# Patient Record
Sex: FEMALE | Race: WHITE | NOT HISPANIC OR LATINO | Employment: STUDENT | URBAN - METROPOLITAN AREA
[De-identification: names, ages, dates, MRNs, and addresses within clinical notes are randomized per-mention and may not be internally consistent; named-entity substitution may affect disease eponyms.]

---
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
## 2024-02-13 DIAGNOSIS — N92.6 IRREGULAR MENSES: ICD-10-CM

## 2024-02-15 NOTE — TELEPHONE ENCOUNTER
Please call patient to inform her that she needs to follow up with adult provider and gyn.  I can refill for an additional 3 months to get her through but she needs to understand that cannot follow here anymore due to her age.

## 2024-02-20 ENCOUNTER — TELEPHONE (OUTPATIENT)
Dept: PEDIATRICS CLINIC | Facility: CLINIC | Age: 19
End: 2024-02-20

## 2024-02-23 RX ORDER — NORETHINDRONE ACETATE AND ETHINYL ESTRADIOL AND FERROUS FUMARATE 1MG-20(21)
1 KIT ORAL DAILY
Qty: 84 TABLET | Refills: 0 | Status: SHIPPED | OUTPATIENT
Start: 2024-02-23

## 2024-02-28 NOTE — TELEPHONE ENCOUNTER
02/28/24 2:27 PM        The office's request has been received, reviewed, and the patient chart updated. The PCP has successfully been removed with a patient attribution note. This message will now be completed.        Thank you  Jo Go

## 2024-05-02 ENCOUNTER — OFFICE VISIT (OUTPATIENT)
Dept: INTERNAL MEDICINE CLINIC | Facility: CLINIC | Age: 19
End: 2024-05-02
Payer: COMMERCIAL

## 2024-05-02 VITALS
OXYGEN SATURATION: 98 % | WEIGHT: 136.2 LBS | BODY MASS INDEX: 24.13 KG/M2 | DIASTOLIC BLOOD PRESSURE: 70 MMHG | HEIGHT: 63 IN | HEART RATE: 71 BPM | SYSTOLIC BLOOD PRESSURE: 118 MMHG

## 2024-05-02 DIAGNOSIS — Z01.419 ENCOUNTER FOR GYNECOLOGICAL EXAMINATION WITHOUT ABNORMAL FINDING: ICD-10-CM

## 2024-05-02 DIAGNOSIS — Z86.69 HISTORY OF HYDROCEPHALUS AS A CHILD: ICD-10-CM

## 2024-05-02 DIAGNOSIS — Z80.0 FAMILY HISTORY OF COLON CANCER IN FATHER: ICD-10-CM

## 2024-05-02 DIAGNOSIS — N92.6 IRREGULAR MENSES: ICD-10-CM

## 2024-05-02 DIAGNOSIS — F41.9 ANXIETY: Primary | ICD-10-CM

## 2024-05-02 DIAGNOSIS — Z86.19 HISTORY OF CMV: ICD-10-CM

## 2024-05-02 DIAGNOSIS — Q04.9 CONGENITAL BRAIN ANOMALY (HCC): ICD-10-CM

## 2024-05-02 DIAGNOSIS — F41.0 PANIC ATTACKS: ICD-10-CM

## 2024-05-02 PROCEDURE — 99203 OFFICE O/P NEW LOW 30 MIN: CPT | Performed by: INTERNAL MEDICINE

## 2024-05-02 PROCEDURE — 3725F SCREEN DEPRESSION PERFORMED: CPT | Performed by: INTERNAL MEDICINE

## 2024-05-02 RX ORDER — CLOBETASOL PROPIONATE 0.5 MG/G
OINTMENT TOPICAL 2 TIMES DAILY
COMMUNITY
Start: 2024-03-05

## 2024-05-02 RX ORDER — ESCITALOPRAM OXALATE 5 MG/1
5 TABLET ORAL DAILY
Qty: 30 TABLET | Refills: 5 | Status: SHIPPED | OUTPATIENT
Start: 2024-05-02

## 2024-05-02 RX ORDER — NORETHINDRONE ACETATE AND ETHINYL ESTRADIOL 1MG-20(21)
1 KIT ORAL DAILY
Qty: 84 TABLET | Refills: 3 | Status: SHIPPED | OUTPATIENT
Start: 2024-05-02

## 2024-05-02 NOTE — PROGRESS NOTES
Assessment/Plan:     Patient is a very pleasant 18-year-old female here to establish care.  Previously following with pediatrics.  Is in college and goes to the [employer].  Studying  radiology tech.  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  We reviewed her medical problems.  Reviewed her concerns today.  Refilled her birth control.  She has a history of CMV mono.  History of hydrocephalus as a baby.  Has a boyfriend for 3 years.    Reports dad has recently been diagnosed with stage III colon cancer.  Had surgery and is on chemotherapy.  Having some anxiety which is starting to affect her quality of life.  Having panic attacks where she will get chest pain shortness of breath.  Her sister was on Prozac in the past.  Her mom did tell her that she is noticing changes in her related to this.  Discussed using SSRIs.  Will try Lexapro 5 mg.  She will come back in 1 month to check in.  Hold off on Atarax for now.    She will be due for colonoscopy at age 29.  Mammogram at age 39.  Hold off on labs for now.  GYN ordered for pelvic exam.    LMP irregular - on birth control.     Quality Measures:       Depression Screening and Follow-up Plan: Patient was screened for depression during today's encounter. They screened negative with a PHQ-2 score of 0.       Return in about 4 weeks (around 5/30/2024).    No problem-specific Assessment & Plan notes found for this encounter.       Diagnoses and all orders for this visit:    Anxiety  -     escitalopram (LEXAPRO) 5 mg tablet; Take 1 tablet (5 mg total) by mouth daily    Irregular menses  -     norethindrone-ethinyl estradiol (Aurovela FE 1/20) 1-20 MG-MCG per tablet; Take 1 tablet by mouth daily    Congenital brain anomaly (HCC)    History of hydrocephalus as a child    Family history of colon cancer in father    Panic attacks    History of CMV    Encounter for gynecological examination without abnormal finding  -     Ambulatory Referral to Obstetrics / Gynecology;  Future    Other orders  -     clobetasol (TEMOVATE) 0.05 % ointment; Apply topically 2 (two) times a day          Subjective:      Patient ID: Meche Contreras is a 18 y.o. female.    Here to establish care.    Anxiety  Presents for initial visit. Onset was 6 to 12 months ago. The problem has been waxing and waning. Symptoms include chest pain, excessive worry, nervous/anxious behavior, palpitations and panic. Patient reports no shortness of breath.         ALLERGIES:  Allergies   Allergen Reactions   • Lactose - Food Allergy Diarrhea and Abdominal Pain       CURRENT MEDICATIONS:    Current Outpatient Medications:   •  clobetasol (TEMOVATE) 0.05 % ointment, Apply topically 2 (two) times a day, Disp: , Rfl:   •  escitalopram (LEXAPRO) 5 mg tablet, Take 1 tablet (5 mg total) by mouth daily, Disp: 30 tablet, Rfl: 5  •  norethindrone-ethinyl estradiol (Aurovela FE 1/20) 1-20 MG-MCG per tablet, Take 1 tablet by mouth daily, Disp: 84 tablet, Rfl: 3    ACTIVE PROBLEM LIST:  Patient Active Problem List   Diagnosis   • Congenital brain anomaly (HCC)   • Cytomegaloviral mononucleosis   • Lactose intolerance       PAST MEDICAL HISTORY:  Past Medical History:   Diagnosis Date   • Abnormal LFTs 11/30/2021   • Bronchiolitis 01/2006   • EBV hepatitis 08/09/2021   • Eczema    • External hydrocephalus (HCC)    • Mononucleosis    • Wrist fracture     left onset 3/2016       PAST SURGICAL HISTORY:  Past Surgical History:   Procedure Laterality Date   • WISDOM TOOTH EXTRACTION         FAMILY HISTORY:  Family History   Problem Relation Age of Onset   • Asthma Mother    • Hemophilia Mother         A carrier   • Cancer Father    • Hemophilia Brother         Severe Hemophilia A   • Seizures Maternal Grandfather    • Chronic granulomatous disease Maternal Grandfather         [employer]   • Scoliosis Maternal Uncle    • Diabetes type I Maternal Uncle    • Hemophilia Maternal Uncle         severe A   • Hemophilia Cousin         Severe Hemophilia A        SOCIAL HISTORY:  Social History     Socioeconomic History   • Marital status: Single     Spouse name: Not on file   • Number of children: Not on file   • Years of education: Not on file   • Highest education level: Not on file   Occupational History   • Not on file   Tobacco Use   • Smoking status: Never   • Smokeless tobacco: Never   Vaping Use   • Vaping status: Never Used   Substance and Sexual Activity   • Alcohol use: Not Currently   • Drug use: Never   • Sexual activity: Yes     Partners: Male     Birth control/protection: Condom Male   Other Topics Concern   • Not on file   Social History Narrative    Lives with both parents in alternating homes, with older sister, younger sister; younger maternal half brother    Pets: dog, cat at Dad's house.  Cats 2 at mom's    Guns in the home stored in locked cabinet. Dad's house    Has carbon monoxide detectors in home    Has smoke detectors    Wears seat belt.    School: 12th grade, [employer] , fall 2022     Social Determinants of Health     Financial Resource Strain: Not on file   Food Insecurity: Not on file   Transportation Needs: Not on file   Physical Activity: Not on file   Stress: Not on file   Social Connections: Not on file   Intimate Partner Violence: Not on file   Housing Stability: Not on file       Review of Systems   Constitutional:  Negative for chills and fever.   HENT:  Negative for ear pain and sore throat.    Eyes:  Negative for pain and visual disturbance.   Respiratory:  Negative for cough and shortness of breath.    Cardiovascular:  Positive for chest pain and palpitations.   Gastrointestinal:  Negative for abdominal pain and vomiting.   Genitourinary:  Negative for dysuria and hematuria.   Musculoskeletal:  Negative for arthralgias and back pain.   Skin:  Negative for color change and rash.   Neurological:  Negative for seizures and syncope.   Psychiatric/Behavioral:  Positive for dysphoric mood. The patient is nervous/anxious.    All  "other systems reviewed and are negative.        Objective:  Vitals:    05/02/24 1154   BP: 118/70   BP Location: Left arm   Patient Position: Sitting   Cuff Size: Standard   Pulse: 71   SpO2: 98%   Weight: 61.8 kg (136 lb 3.2 oz)   Height: 5' 3\" (1.6 m)     Body mass index is 24.13 kg/m².     Physical Exam  Vitals and nursing note reviewed.   Constitutional:       Appearance: Normal appearance. She is normal weight.   HENT:      Head: Normocephalic and atraumatic.   Cardiovascular:      Rate and Rhythm: Normal rate and regular rhythm.      Heart sounds: Normal heart sounds.   Pulmonary:      Effort: Pulmonary effort is normal.      Breath sounds: Normal breath sounds.   Abdominal:      General: Abdomen is flat. Bowel sounds are normal.      Palpations: Abdomen is soft.   Musculoskeletal:         General: Normal range of motion.      Cervical back: Normal range of motion.      Right lower leg: No edema.      Left lower leg: No edema.   Lymphadenopathy:      Cervical: No cervical adenopathy.   Skin:     General: Skin is warm and dry.   Neurological:      General: No focal deficit present.      Mental Status: She is alert and oriented to person, place, and time. Mental status is at baseline.   Psychiatric:         Mood and Affect: Mood normal.         RESULTS:  Hemoglobin   Date/Time Value Ref Range Status   08/16/2021 11:23 AM 12.7 11.5 - 15.4 g/dL Final     Hematocrit   Date/Time Value Ref Range Status   08/16/2021 11:23 AM 39.2 34.8 - 46.1 % Final     Platelets   Date/Time Value Ref Range Status   08/16/2021 11:23  149 - 390 Thousands/uL Final     Sodium   Date/Time Value Ref Range Status   08/06/2021 12:18  136 - 145 mmol/L Final     BUN   Date/Time Value Ref Range Status   08/06/2021 12:18 PM 8 5 - 25 mg/dL Final     Creatinine   Date/Time Value Ref Range Status   08/06/2021 12:18 PM 0.84 0.60 - 1.30 mg/dL Final     Comment:     Standardized to IDMS reference method      In chart    This note was " created with voice recognition software.  Phonic, grammatical and spelling errors may be present within the note as a result.

## 2024-06-03 ENCOUNTER — RA CDI HCC (OUTPATIENT)
Dept: OTHER | Facility: HOSPITAL | Age: 19
End: 2024-06-03

## 2024-06-10 ENCOUNTER — OFFICE VISIT (OUTPATIENT)
Dept: INTERNAL MEDICINE CLINIC | Facility: CLINIC | Age: 19
End: 2024-06-10
Payer: COMMERCIAL

## 2024-06-10 VITALS
DIASTOLIC BLOOD PRESSURE: 72 MMHG | SYSTOLIC BLOOD PRESSURE: 120 MMHG | OXYGEN SATURATION: 97 % | HEIGHT: 63 IN | WEIGHT: 135 LBS | HEART RATE: 89 BPM | BODY MASS INDEX: 23.92 KG/M2

## 2024-06-10 DIAGNOSIS — F41.9 ANXIETY: Primary | ICD-10-CM

## 2024-06-10 PROCEDURE — 99213 OFFICE O/P EST LOW 20 MIN: CPT | Performed by: INTERNAL MEDICINE

## 2024-06-10 NOTE — PROGRESS NOTES
"Ambulatory Visit  Name: Meche Contreras      : 2005      MRN: [id number]  Encounter Provider: Selwyn Arguelles MD  Encounter Date: 6/10/2024   Encounter department: [facility] INTERNAL MEDICINE [place]    Assessment & Plan     1. Anxiety  Assessment & Plan:  Better controlled but had palpitations for the first week, she would like to increase it to 10 mg. Take 2 tabs and let me know how you react to it.   Orders:  -     TSH, 3rd generation with Free T4 reflex; Future      Depression Screening and Follow-up Plan: Patient was screened for depression during today's encounter. They screened negative with a PHQ-2 score of 0.Clincally patient does not have depression. No treatment is required.     History of Present Illness     Here for 1 month follow up.      Review of Systems   Constitutional:  Negative for chills and fever.   HENT:  Negative for ear pain and sore throat.    Eyes:  Negative for pain and visual disturbance.   Respiratory:  Negative for cough and shortness of breath.    Cardiovascular:  Negative for chest pain and palpitations.   Gastrointestinal:  Negative for abdominal pain and vomiting.   Genitourinary:  Negative for dysuria and hematuria.   Musculoskeletal:  Negative for arthralgias and back pain.   Skin:  Negative for color change and rash.   Neurological:  Negative for seizures and syncope.   All other systems reviewed and are negative.    Pertinent Medical History       Objective     /72 (BP Location: Left arm, Patient Position: Sitting, Cuff Size: Standard)   Pulse 89   Ht 5' 3\" (1.6 m)   Wt 61.2 kg (135 lb)   SpO2 97%   BMI 23.91 kg/m²     Physical Exam  Vitals and nursing note reviewed.   Constitutional:       General: She is not in acute distress.     Appearance: She is well-developed.   HENT:      Head: Normocephalic and atraumatic.   Eyes:      Conjunctiva/sclera: Conjunctivae normal.   Cardiovascular:      Rate and Rhythm: Normal rate.      Heart sounds: No murmur " heard.  Pulmonary:      Effort: Pulmonary effort is normal. No respiratory distress.   Abdominal:      Tenderness: There is no abdominal tenderness.   Musculoskeletal:         General: No swelling.   Skin:     General: Skin is warm and dry.      Capillary Refill: Capillary refill takes less than 2 seconds.   Neurological:      Mental Status: She is alert.   Psychiatric:         Mood and Affect: Mood normal.     Administrative Statements     I have spent a total time of 15 minutes on 06/10/24 In caring for this patient including Counseling / Coordination of care and Documenting in the medical record.

## 2024-06-10 NOTE — ASSESSMENT & PLAN NOTE
Better controlled but had palpitations for the first week, she would like to increase it to 10 mg. Take 2 tabs and let me know how you react to it.

## 2024-06-13 LAB — TSH SERPL-ACNC: 1.56 UIU/ML (ref 0.45–5.33)

## 2024-06-18 DIAGNOSIS — F41.9 ANXIETY: ICD-10-CM

## 2024-06-18 RX ORDER — ESCITALOPRAM OXALATE 10 MG/1
10 TABLET ORAL DAILY
Qty: 90 TABLET | Refills: 0 | Status: SHIPPED | OUTPATIENT
Start: 2024-06-18 | End: 2024-09-16

## 2024-07-19 ENCOUNTER — OFFICE VISIT (OUTPATIENT)
Dept: INTERNAL MEDICINE CLINIC | Facility: CLINIC | Age: 19
End: 2024-07-19
Payer: COMMERCIAL

## 2024-07-19 VITALS
DIASTOLIC BLOOD PRESSURE: 70 MMHG | OXYGEN SATURATION: 98 % | WEIGHT: 135 LBS | SYSTOLIC BLOOD PRESSURE: 116 MMHG | HEART RATE: 82 BPM | RESPIRATION RATE: 17 BRPM | HEIGHT: 63 IN | BODY MASS INDEX: 23.92 KG/M2

## 2024-07-19 DIAGNOSIS — F41.9 ANXIETY: Primary | ICD-10-CM

## 2024-07-19 PROCEDURE — 99213 OFFICE O/P EST LOW 20 MIN: CPT | Performed by: INTERNAL MEDICINE

## 2024-07-19 NOTE — ASSESSMENT & PLAN NOTE
Not feeling the best on lexapro but we will try taking it at night. If no better after 3 weeks, will change to another SSRI.

## 2024-07-19 NOTE — PROGRESS NOTES
Ambulatory Visit  Name: Meche Contreras      : 2005      MRN: [id number]  Encounter Provider: Selwyn Arguelles MD  Encounter Date: 2024   Encounter department: [facility] [place]    Assessment & Plan     Reports brain fog at times, feels like it may be the lexapro. Try taking it at night. Come back in 2 weeks.    1. Anxiety  Assessment & Plan:  Not feeling the best on lexapro but we will try taking it at night. If no better after 3 weeks, will change to another SSRI.      Depression Screening and Follow-up Plan: Clincally patient does not have depression. No treatment is required.         History of Present Illness     Here for medication follow up.      Review of Systems   Constitutional:  Negative for chills and fever.   HENT:  Negative for ear pain and sore throat.    Eyes:  Negative for pain and visual disturbance.   Respiratory:  Negative for cough and shortness of breath.    Cardiovascular:  Negative for chest pain and palpitations.   Gastrointestinal:  Negative for abdominal pain and vomiting.   Genitourinary:  Negative for dysuria and hematuria.   Musculoskeletal:  Negative for arthralgias and back pain.   Skin:  Negative for color change and rash.   Neurological:  Negative for seizures and syncope.   Psychiatric/Behavioral:  Positive for dysphoric mood. The patient is nervous/anxious.    All other systems reviewed and are negative.    Past Medical History:   Diagnosis Date    Abnormal LFTs 2021    Anxiety     Bronchiolitis 2006    EBV hepatitis 2021    Eczema     External hydrocephalus (HCC)     Mononucleosis     Wrist fracture     left onset 3/2016     Past Surgical History:   Procedure Laterality Date    WISDOM TOOTH EXTRACTION       Family History   Problem Relation Age of Onset    Asthma Mother     Hemophilia Mother         A carrier    Cancer Father     Colon cancer Father     Depression Sister     ADD / ADHD Sister     Hemophilia Brother          "Severe Hemophilia A    Seizures Maternal Grandfather     Chronic granulomatous disease Maternal Grandfather         wegeners    Scoliosis Maternal Uncle     Diabetes type I Maternal Uncle     Hemophilia Maternal Uncle         severe A    Hemophilia Cousin         Severe Hemophilia A     Social History     Tobacco Use    Smoking status: Never    Smokeless tobacco: Never   Vaping Use    Vaping status: Never Used   Substance and Sexual Activity    Alcohol use: Not Currently    Drug use: Never    Sexual activity: Yes     Partners: Male     Birth control/protection: Condom Male     Current Outpatient Medications on File Prior to Visit   Medication Sig    clobetasol (TEMOVATE) 0.05 % ointment Apply topically 2 (two) times a day    escitalopram (LEXAPRO) 10 mg tablet Take 1 tablet (10 mg total) by mouth daily    norethindrone-ethinyl estradiol (Aurovela FE 1/20) 1-20 MG-MCG per tablet Take 1 tablet by mouth daily     Allergies   Allergen Reactions    Lactose - Food Allergy Diarrhea and Abdominal Pain     Immunization History   Administered Date(s) Administered    DTaP / IPV 02/23/2009    DTaP 5 02/14/2005, 05/02/2005, 07/28/2005, 06/29/2006    HPV9 01/31/2017, 02/05/2018    Hep A, adult 12/31/2014    Hep A, ped/adol, 2 dose 01/31/2017    Hep B / HiB 01/20/2006    Hep B, Adolescent or Pediatric 2004, 02/14/2005, 05/02/2005    Hib (PRP-OMP) 02/14/2005, 05/02/2005    IPV 02/14/2005, 05/02/2005, 06/29/2006    Influenza LAIV (Nasal) 09/06/2013    MMR 03/24/2006, 02/25/2010    Meningococcal, Unknown Serogroups 01/28/2016    Pneumococcal Conjugate PCV 7 02/14/2005, 05/02/2005, 07/28/2005, 03/24/2006    Tdap 01/28/2016    Varicella 01/20/2006, 02/25/2010    meningococcal ACYW-135 TT Conjugate 09/28/2021     Objective   /70 (BP Location: Right arm, Patient Position: Sitting, Cuff Size: Adult)   Pulse 82   Resp 17   Ht 5' 3\" (1.6 m)   Wt 61.2 kg (135 lb)   SpO2 98%   BMI 23.91 kg/m²     Physical Exam  Vitals and " nursing note reviewed.   Constitutional:       General: She is not in acute distress.     Appearance: She is well-developed.   HENT:      Head: Normocephalic.   Cardiovascular:      Rate and Rhythm: Normal rate and regular rhythm.      Heart sounds: No murmur heard.  Pulmonary:      Effort: Pulmonary effort is normal. No respiratory distress.   Abdominal:      Tenderness: There is no abdominal tenderness.   Musculoskeletal:         General: No swelling.      Cervical back: Neck supple.   Skin:     General: Skin is warm and dry.      Capillary Refill: Capillary refill takes less than 2 seconds.   Neurological:      Mental Status: She is alert and oriented to person, place, and time.   Psychiatric:         Mood and Affect: Mood normal.       Administrative Statements   I have spent a total time of 15 minutes in caring for this patient on the day of the visit/encounter including Patient and family education, Importance of tx compliance, Impressions, Counseling / Coordination of care, and Obtaining or reviewing history  .

## 2024-08-14 ENCOUNTER — OFFICE VISIT (OUTPATIENT)
Dept: INTERNAL MEDICINE CLINIC | Facility: CLINIC | Age: 19
End: 2024-08-14
Payer: COMMERCIAL

## 2024-08-14 VITALS
RESPIRATION RATE: 17 BRPM | HEIGHT: 63 IN | WEIGHT: 135 LBS | BODY MASS INDEX: 23.92 KG/M2 | SYSTOLIC BLOOD PRESSURE: 118 MMHG | HEART RATE: 73 BPM | DIASTOLIC BLOOD PRESSURE: 70 MMHG | OXYGEN SATURATION: 98 %

## 2024-08-14 DIAGNOSIS — L03.031 PARONYCHIA OF GREAT TOE OF RIGHT FOOT: Primary | ICD-10-CM

## 2024-08-14 DIAGNOSIS — R22.1 LOCALIZED SWELLING, MASS AND LUMP, NECK: ICD-10-CM

## 2024-08-14 DIAGNOSIS — F41.9 ANXIETY: ICD-10-CM

## 2024-08-14 PROCEDURE — 99214 OFFICE O/P EST MOD 30 MIN: CPT | Performed by: INTERNAL MEDICINE

## 2024-08-14 RX ORDER — CEPHALEXIN 500 MG/1
500 CAPSULE ORAL 2 TIMES DAILY
Qty: 20 CAPSULE | Refills: 0 | Status: SHIPPED | OUTPATIENT
Start: 2024-08-14 | End: 2024-08-24

## 2024-08-14 NOTE — PROGRESS NOTES
Ambulatory Visit  Name: Meche Contreras      : 2005      MRN: [id number]  Encounter Provider: Selwyn Arguelles MD  Encounter Date: 2024   Encounter department: [facility] [place]    Assessment & Plan     Continue lexapro for anxiety. Since taking it at night , she feels much better.    Recently stubbed her toe, will send to podiatry    There is enlargement of left anterior neck LN, present x months, will check US.    1. Paronychia of great toe of right foot  -     Ambulatory Referral to Podiatry; Future  -     cephalexin (KEFLEX) 500 mg capsule; Take 1 capsule (500 mg total) by mouth 2 (two) times a day for 10 days  2. Anxiety  3. Localized swelling, mass and lump, neck  -     US head neck soft tissue; Future; Expected date: 2024      Depression Screening and Follow-up Plan: Clincally patient does not have depression. No treatment is required.       History of Present Illness   Here for f/u.        Review of Systems   Constitutional:  Negative for chills and fever.   HENT:  Negative for ear pain and sore throat.    Eyes:  Negative for pain and visual disturbance.   Respiratory:  Negative for cough and shortness of breath.    Cardiovascular:  Negative for chest pain and palpitations.   Gastrointestinal:  Negative for abdominal pain and vomiting.   Genitourinary:  Negative for dysuria and hematuria.   Musculoskeletal:  Negative for arthralgias and back pain.   Skin:  Negative for color change and rash.   Neurological:  Negative for seizures and syncope.   All other systems reviewed and are negative.    Past Medical History   Past Medical History:   Diagnosis Date    Abnormal LFTs 2021    Anxiety     Bronchiolitis 2006    EBV hepatitis 2021    Eczema     External hydrocephalus (HCC)     Mononucleosis     Wrist fracture     left onset 3/2016     Past Surgical History:   Procedure Laterality Date    WISDOM TOOTH EXTRACTION       Family History   Problem Relation  "Age of Onset    Asthma Mother     Hemophilia Mother         A carrier    Cancer Father     Colon cancer Father     Depression Sister     ADD / ADHD Sister     Hemophilia Brother         Severe Hemophilia A    Seizures Maternal Grandfather     Chronic granulomatous disease Maternal Grandfather         wegeners    Scoliosis Maternal Uncle     Diabetes type I Maternal Uncle     Hemophilia Maternal Uncle         severe A    Hemophilia Cousin         Severe Hemophilia A     Current Outpatient Medications on File Prior to Visit   Medication Sig Dispense Refill    clobetasol (TEMOVATE) 0.05 % ointment Apply topically 2 (two) times a day      escitalopram (LEXAPRO) 10 mg tablet Take 1 tablet (10 mg total) by mouth daily 90 tablet 0    norethindrone-ethinyl estradiol (Aurovela FE 1/20) 1-20 MG-MCG per tablet Take 1 tablet by mouth daily 84 tablet 3     No current facility-administered medications on file prior to visit.     Allergies   Allergen Reactions    Lactose - Food Allergy Diarrhea and Abdominal Pain      Objective   /70 (BP Location: Left arm, Patient Position: Sitting, Cuff Size: Adult)   Pulse 73   Resp 17   Ht 5' 3\" (1.6 m)   Wt 61.2 kg (135 lb)   SpO2 98%   BMI 23.91 kg/m²     Physical Exam  Vitals and nursing note reviewed.   Constitutional:       General: She is not in acute distress.     Appearance: She is well-developed.   HENT:      Head: Normocephalic and atraumatic.   Eyes:      Conjunctiva/sclera: Conjunctivae normal.   Cardiovascular:      Rate and Rhythm: Normal rate and regular rhythm.      Heart sounds: No murmur heard.  Pulmonary:      Effort: Pulmonary effort is normal. No respiratory distress.      Breath sounds: Normal breath sounds.   Abdominal:      Palpations: Abdomen is soft.      Tenderness: There is no abdominal tenderness.   Musculoskeletal:         General: No swelling.      Cervical back: Neck supple.   Feet:      Right foot:      Toenail Condition: Right toenails are ingrown. "   Skin:     General: Skin is warm and dry.      Capillary Refill: Capillary refill takes less than 2 seconds.   Neurological:      Mental Status: She is alert.   Psychiatric:         Mood and Affect: Mood normal.       I have spent a total time of 15 minutes in caring for this patient on the day of the visit/encounter including Patient and family education, Importance of tx compliance, Risk factor reductions, Documenting in the medical record, and Obtaining or reviewing history  .

## 2024-08-29 ENCOUNTER — HOSPITAL ENCOUNTER (OUTPATIENT)
Dept: ULTRASOUND IMAGING | Facility: HOSPITAL | Age: 19
End: 2024-08-29
Attending: INTERNAL MEDICINE
Payer: COMMERCIAL

## 2024-08-29 DIAGNOSIS — R22.1 LOCALIZED SWELLING, MASS AND LUMP, NECK: ICD-10-CM

## 2024-08-29 PROCEDURE — 76536 US EXAM OF HEAD AND NECK: CPT

## 2024-09-17 DIAGNOSIS — F41.9 ANXIETY: ICD-10-CM

## 2024-09-17 RX ORDER — ESCITALOPRAM OXALATE 10 MG/1
10 TABLET ORAL DAILY
Qty: 90 TABLET | Refills: 0 | Status: SHIPPED | OUTPATIENT
Start: 2024-09-17 | End: 2024-12-16

## 2024-09-17 NOTE — TELEPHONE ENCOUNTER
Requested Prescriptions     Pending Prescriptions Disp Refills    escitalopram (LEXAPRO) 10 mg tablet 90 tablet 0     Sig: Take 1 tablet (10 mg total) by mouth daily     LAST SEEN: 8/24/24  NEXT APPT: 11/14/24  LAST FILLED: 6/18/24

## 2024-11-14 ENCOUNTER — OFFICE VISIT (OUTPATIENT)
Dept: INTERNAL MEDICINE CLINIC | Facility: CLINIC | Age: 19
End: 2024-11-14
Payer: COMMERCIAL

## 2024-11-14 VITALS
WEIGHT: 151 LBS | DIASTOLIC BLOOD PRESSURE: 74 MMHG | OXYGEN SATURATION: 98 % | HEART RATE: 85 BPM | BODY MASS INDEX: 26.75 KG/M2 | RESPIRATION RATE: 16 BRPM | HEIGHT: 63 IN | SYSTOLIC BLOOD PRESSURE: 110 MMHG

## 2024-11-14 DIAGNOSIS — F41.9 ANXIETY: Primary | ICD-10-CM

## 2024-11-14 PROCEDURE — 99213 OFFICE O/P EST LOW 20 MIN: CPT | Performed by: INTERNAL MEDICINE

## 2024-11-14 NOTE — PROGRESS NOTES
Name: Meche Contreras      : 2005      MRN: [id number]  Encounter Provider: Selwyn Arguelles MD  Encounter Date: 2024   Encounter department: [facility] [place]  :  Assessment & Plan  Anxiety  Continue lexapro. She feels well today! She feels more motivated!               Depression Screening and Follow-up Plan: Patient was screened for depression during today's encounter. They screened negative with a PHQ-2 score of 0.        Here for follow up.    Review of Systems   Constitutional:  Negative for chills and fever.   HENT:  Negative for ear pain and sore throat.    Eyes:  Negative for pain and visual disturbance.   Respiratory:  Negative for cough and shortness of breath.    Cardiovascular:  Negative for chest pain and palpitations.   Gastrointestinal:  Negative for abdominal pain and vomiting.   Genitourinary:  Negative for dysuria and hematuria.   Musculoskeletal:  Negative for arthralgias and back pain.   Skin:  Negative for color change and rash.   Neurological:  Negative for seizures and syncope.   All other systems reviewed and are negative.    Past Medical History   Past Medical History:   Diagnosis Date   • Abnormal LFTs 2021   • Anxiety    • Bronchiolitis 2006   • EBV hepatitis 2021   • Eczema    • External hydrocephalus (HCC)    • Mononucleosis    • Wrist fracture     left onset 3/2016     Past Surgical History:   Procedure Laterality Date   • TOENAIL EXCISION Right 2024   • WISDOM TOOTH EXTRACTION       Family History   Problem Relation Age of Onset   • Asthma Mother    • Hemophilia Mother         A carrier   • Cancer Father    • Colon cancer Father    • Depression Sister    • ADD / ADHD Sister    • Hemophilia Brother         Severe Hemophilia A   • Seizures Maternal Grandfather    • Chronic granulomatous disease Maternal Grandfather         wegeners   • Scoliosis Maternal Uncle    • Diabetes type I Maternal Uncle    • Hemophilia Maternal Uncle   "       severe A   • Hemophilia Cousin         Severe Hemophilia A      reports that she has never smoked. She has never used smokeless tobacco. She reports that she does not currently use alcohol. She reports that she does not use drugs.  Current Outpatient Medications on File Prior to Visit   Medication Sig Dispense Refill   • clobetasol (TEMOVATE) 0.05 % ointment Apply topically 2 (two) times a day     • escitalopram (LEXAPRO) 10 mg tablet Take 1 tablet (10 mg total) by mouth daily 90 tablet 0   • norethindrone-ethinyl estradiol (Aurovela FE 1/20) 1-20 MG-MCG per tablet Take 1 tablet by mouth daily 84 tablet 3     No current facility-administered medications on file prior to visit.     Allergies   Allergen Reactions   • Lactose - Food Allergy Diarrhea and Abdominal Pain           /74 (BP Location: Left arm, Patient Position: Sitting, Cuff Size: Adult)   Pulse 85   Resp 16   Ht 5' 3\" (1.6 m)   Wt 68.5 kg (151 lb)   SpO2 98%   BMI 26.75 kg/m²      Physical Exam  Vitals and nursing note reviewed.   Constitutional:       General: She is not in acute distress.     Appearance: She is well-developed.   HENT:      Head: Normocephalic and atraumatic.   Eyes:      Conjunctiva/sclera: Conjunctivae normal.   Cardiovascular:      Rate and Rhythm: Normal rate.      Heart sounds: No murmur heard.  Pulmonary:      Effort: Pulmonary effort is normal. No respiratory distress.   Abdominal:      Tenderness: There is no abdominal tenderness.   Musculoskeletal:         General: No swelling.   Skin:     General: Skin is warm and dry.      Capillary Refill: Capillary refill takes less than 2 seconds.   Neurological:      Mental Status: She is alert.   Psychiatric:         Mood and Affect: Mood normal.       I have spent a total time of 15 minutes in caring for this patient on the day of the visit/encounter including Impressions, Documenting in the medical record, and Reviewing / ordering tests, medicine, procedures  .   "

## 2024-12-16 DIAGNOSIS — F41.9 ANXIETY: ICD-10-CM

## 2024-12-18 RX ORDER — ESCITALOPRAM OXALATE 10 MG/1
10 TABLET ORAL DAILY
Qty: 90 TABLET | Refills: 1 | Status: SHIPPED | OUTPATIENT
Start: 2024-12-18 | End: 2025-03-18

## 2024-12-18 NOTE — TELEPHONE ENCOUNTER
Patient called in because she went to the pharmacy to  her Escitalopram and they said it wasn't filled yet. I told her it was still pending, but that I would send a follow up message about getting it approved today because she is completely out. Please review and refill as soon as possible.

## 2025-04-13 DIAGNOSIS — N92.6 IRREGULAR MENSES: ICD-10-CM

## 2025-04-14 RX ORDER — NORETHINDRONE ACETATE AND ETHINYL ESTRADIOL AND FERROUS FUMARATE 1MG-20(21)
1 KIT ORAL DAILY
Qty: 84 TABLET | Refills: 0 | Status: SHIPPED | OUTPATIENT
Start: 2025-04-14 | End: 2025-04-15 | Stop reason: SDUPTHER

## 2025-04-14 NOTE — TELEPHONE ENCOUNTER
Patient called to request a refill for their birth control advised a refill was requested on 4/13 and is pending approval. Patient verbalized understanding and is in agreement.     Does the patient have enough for 3 days?   [] Yes   [x] No - Send as HP to POD - pt is out of her medication, has gone one day without it and is worried about not having it, would like a call back once it's been refilled

## 2025-04-15 DIAGNOSIS — N92.6 IRREGULAR MENSES: ICD-10-CM

## 2025-04-15 RX ORDER — NORETHINDRONE ACETATE AND ETHINYL ESTRADIOL AND FERROUS FUMARATE 1MG-20(21)
1 KIT ORAL DAILY
Qty: 84 TABLET | Refills: 0 | Status: SHIPPED | OUTPATIENT
Start: 2025-04-15

## 2025-05-20 ENCOUNTER — OFFICE VISIT (OUTPATIENT)
Dept: INTERNAL MEDICINE CLINIC | Facility: CLINIC | Age: 20
End: 2025-05-20
Payer: COMMERCIAL

## 2025-05-20 VITALS
DIASTOLIC BLOOD PRESSURE: 76 MMHG | SYSTOLIC BLOOD PRESSURE: 108 MMHG | HEIGHT: 63 IN | WEIGHT: 144 LBS | BODY MASS INDEX: 25.52 KG/M2 | RESPIRATION RATE: 16 BRPM | HEART RATE: 75 BPM | OXYGEN SATURATION: 96 %

## 2025-05-20 DIAGNOSIS — Z13.6 SCREENING FOR HEART DISEASE: ICD-10-CM

## 2025-05-20 DIAGNOSIS — F41.9 ANXIETY: ICD-10-CM

## 2025-05-20 DIAGNOSIS — Z00.00 ANNUAL PHYSICAL EXAM: Primary | ICD-10-CM

## 2025-05-20 DIAGNOSIS — Z13.1 SCREENING FOR DIABETES MELLITUS: ICD-10-CM

## 2025-05-20 PROBLEM — B27.10: Status: RESOLVED | Noted: 2021-08-09 | Resolved: 2025-05-20

## 2025-05-20 PROCEDURE — 99395 PREV VISIT EST AGE 18-39: CPT

## 2025-05-20 NOTE — PROGRESS NOTES
Adult Annual Physical  Name: Meche Contreras      : 2005      MRN: [id number]  Encounter Provider: LINO Benavides  Encounter Date: 2025   Encounter department: [facility] [place]    :  Assessment & Plan  Annual physical exam  Annual physical completed.  Patient is doing well, she was excepted into the radiology program at [place], paperwork completed today.  Labs to be updated.       Anxiety  MINISTERIO-7 Flowsheet Screening    Flowsheet Row Most Recent Value   Over the last two weeks, how often have you been bothered by the following problems?     Feeling nervous, anxious, or on edge 1   Not being able to stop or control worrying 1   Worrying too much about different things 1   Trouble relaxing  1   Being so restless that it's hard to sit still 2   Becoming easily annoyed or irritable  2   Feeling afraid as if something awful might happen 0   How difficult have these problems made it for you to do your work, take care of things at home, or get along with other people?  Somewhat difficult   MINISTERIO Score  8      Orders:  •  CBC and differential; Future  •  TSH, 3rd generation with Free T4 reflex; Future    Screening for heart disease  Orders:  •  Lipid panel; Future    Screening for diabetes mellitus  Orders:  •  Comprehensive metabolic panel; Future        Preventive Screenings:  - Diabetes Screening: risks/benefits discussed and orders placed  - Cholesterol Screening: risks/benefits discussed and orders placed   - Cervical cancer screening: screening not indicated   - Colon cancer screening: screening not indicated   - Lung cancer screening: screening not indicated     Immunizations:  - Immunizations due: HPV (Gardasil 9)    Counseling/Anticipatory Guidance:    - Dental health: discussed importance of regular tooth brushing, flossing, and dental visits.   - Diet: discussed recommendations for a healthy/well-balanced diet.   - Exercise: the importance of regular  exercise/physical activity was discussed. Recommend exercise 3-5 times per week for at least 30 minutes.   - Injury prevention: discussed safety/seat belts, safety helmets, smoke detectors, carbon monoxide detectors, and smoking near bedding or upholstery.       Depression Screening and Follow-up Plan: Patient was screened for depression during today's encounter. They screened negative with a PHQ-2 score of 0.          History of Present Illness   {?Quick Links Encounters * My Last Note * Last Note in Specialty * Snapshot * Since Last Visit * History :10897}  Adult Annual Physical:  Patient presents for annual physical.     Diet and Physical Activity:  - Diet/Nutrition: well balanced diet, portion control, limited junk food, low calorie diet, intermittent fasting, adequate fiber intake and adequate whole grain intake. No dairy containing products  - Exercise: walking, moderate cardiovascular exercise, 3-4 times a week on average and 30-60 minutes on average.    Depression Screening:  - PHQ-2 Score: 0    General Health:  - Sleep: 7-8 hours of sleep on average and unrefreshing sleep. frequent napping  - Hearing: normal hearing bilateral ears.  - Vision: no vision problems and most recent eye exam > 1 year ago.  - Dental: no dental visits for > 1 year, brushes teeth twice daily and floss regularly.    /GYN Health:  - Follows with GYN: no.   - Menopause: premenopausal.   - Last menstrual cycle: 5/5/2025.   - History of STDs: no  - Contraception: oral contraceptives.      Advanced Care Planning:  - Has an advanced directive?: no    - Has a durable medical POA?: no      Review of Systems   Constitutional:  Negative for chills and fever.   HENT:  Negative for congestion, ear pain, rhinorrhea and sore throat.    Eyes:  Negative for pain and visual disturbance.   Respiratory:  Negative for cough, chest tightness and shortness of breath.    Cardiovascular:  Negative for chest pain, palpitations and leg swelling.  "  Gastrointestinal:  Negative for abdominal pain, constipation, diarrhea, nausea and vomiting.   Endocrine: Negative.    Genitourinary:  Negative for dysuria, frequency, hematuria and urgency.   Musculoskeletal:  Negative for arthralgias and back pain.   Skin:  Negative for color change and rash.   Allergic/Immunologic: Negative.    Neurological:  Negative for dizziness, seizures, syncope and headaches.   Hematological: Negative.    Psychiatric/Behavioral: Negative.     All other systems reviewed and are negative.        Objective {?Quick Links Trend Vitals * Enter New Vitals * Results Review * Timeline (Adult) * Labs * Imaging * Cardiology * Procedures * Lung Cancer Screening * Surgical eConsent :70052}  /76 (BP Location: Left arm, Patient Position: Sitting, Cuff Size: Adult)   Pulse 75   Resp 16   Ht 5' 3\" (1.6 m)   Wt 65.3 kg (144 lb 0.1 oz)   LMP 05/05/2025   SpO2 96%   BMI 25.51 kg/m²     Physical Exam  Vitals and nursing note reviewed.   Constitutional:       General: She is not in acute distress.     Appearance: She is well-developed.   HENT:      Head: Normocephalic and atraumatic.      Right Ear: Tympanic membrane normal.      Left Ear: Tympanic membrane normal.      Mouth/Throat:      Mouth: Mucous membranes are moist.      Pharynx: Oropharynx is clear.     Eyes:      Conjunctiva/sclera: Conjunctivae normal.     Neck:      Vascular: No carotid bruit.     Cardiovascular:      Rate and Rhythm: Normal rate and regular rhythm.      Pulses: Normal pulses.      Heart sounds: Normal heart sounds. No murmur heard.  Pulmonary:      Effort: Pulmonary effort is normal. No respiratory distress.      Breath sounds: Normal breath sounds.   Abdominal:      General: Bowel sounds are normal.      Palpations: Abdomen is soft.      Tenderness: There is no abdominal tenderness.     Musculoskeletal:         General: No swelling. Normal range of motion.      Cervical back: Normal range of motion and neck supple. "   Lymphadenopathy:      Cervical: No cervical adenopathy.     Skin:     General: Skin is warm and dry.      Capillary Refill: Capillary refill takes less than 2 seconds.     Neurological:      Mental Status: She is alert and oriented to person, place, and time.     Psychiatric:         Mood and Affect: Mood normal.

## 2025-05-20 NOTE — ASSESSMENT & PLAN NOTE
MINISTERIO-7 Flowsheet Screening    Flowsheet Row Most Recent Value   Over the last two weeks, how often have you been bothered by the following problems?     Feeling nervous, anxious, or on edge 1   Not being able to stop or control worrying 1   Worrying too much about different things 1   Trouble relaxing  1   Being so restless that it's hard to sit still 2   Becoming easily annoyed or irritable  2   Feeling afraid as if something awful might happen 0   How difficult have these problems made it for you to do your work, take care of things at home, or get along with other people?  Somewhat difficult   MINISTERIO Score  8      Orders:  •  CBC and differential; Future  •  TSH, 3rd generation with Free T4 reflex; Future

## 2025-05-20 NOTE — PATIENT INSTRUCTIONS
"Patient Education     Routine physical for adults   The Basics   Written by the doctors and editors at [facility]   What is a physical? -- A physical is a routine visit, or \"check-up,\" with your doctor. You might also hear it called a \"wellness visit\" or \"preventive visit.\"  During each visit, the doctor will:   Ask about your physical and mental health   Ask about your habits, behaviors, and lifestyle   Do an exam   Give you vaccines if needed   Talk to you about any medicines you take   Give advice about your health   Answer your questions  Getting regular check-ups is an important part of taking care of your health. It can help your doctor find and treat any problems you have. But it's also important for preventing health problems.  A routine physical is different from a \"sick visit.\" A sick visit is when you see a doctor because of a health concern or problem. Since physicals are scheduled ahead of time, you can think about what you want to ask the doctor.  How often should I get a physical? -- It depends on your age and health. In general, for people age 21 years and older:   If you are younger than 50 years, you might be able to get a physical every 3 years.   If you are 50 years or older, your doctor might recommend a physical every year.  If you have an ongoing health condition, like diabetes or high blood pressure, your doctor will probably want to see you more often.  What happens during a physical? -- In general, each visit will include:   Physical exam - The doctor or nurse will check your height, weight, heart rate, and blood pressure. They will also look at your eyes and ears. They will ask about how you are feeling and whether you have any symptoms that bother you.   Medicines - It's a good idea to bring a list of all the medicines you take to each doctor visit. Your doctor will talk to you about your medicines and answer any questions. Tell them if you are having any side effects that bother you. You " "should also tell them if you are having trouble paying for any of your medicines.   Habits and behaviors - This includes:   Your diet   Your exercise habits   Whether you smoke, drink alcohol, or use drugs   Whether you are sexually active   Whether you feel safe at home  Your doctor will talk to you about things you can do to improve your health and lower your risk of health problems. They will also offer help and support. For example, if you want to quit smoking, they can give you advice and might prescribe medicines. If you want to improve your diet or get more physical activity, they can help you with this, too.   Lab tests, if needed - The tests you get will depend on your age and situation. For example, your doctor might want to check your:   Cholesterol   Blood sugar   Iron level   Vaccines - The recommended vaccines will depend on your age, health, and what vaccines you already had. Vaccines are very important because they can prevent certain serious or deadly infections.   Discussion of screening - \"Screening\" means checking for diseases or other health problems before they cause symptoms. Your doctor can recommend screening based on your age, risk, and preferences. This might include tests to check for:   Cancer, such as breast, prostate, cervical, ovarian, colorectal, prostate, lung, or skin cancer   Sexually transmitted infections, such as chlamydia and gonorrhea   Mental health conditions like depression and anxiety  Your doctor will talk to you about the different types of screening tests. They can help you decide which screenings to have. They can also explain what the results might mean.   Answering questions - The physical is a good time to ask the doctor or nurse questions about your health. If needed, they can refer you to other doctors or specialists, too.  Adults older than 65 years often need other care, too. As you get older, your doctor will talk to you about:   How to prevent falling at " home   Hearing or vision tests   Memory testing   How to take your medicines safely   Making sure that you have the help and support you need at home  All topics are updated as new evidence becomes available and our peer review process is complete.  This topic retrieved from [employer] on: May 02, 2024.  Topic 746022 Version 1.0  Release: 32.4.3 - C32.122  © 2024 UpToDate, Inc. and/or its affiliates. All rights reserved.  Consumer Information Use and Disclaimer   Disclaimer: This generalized information is a limited summary of diagnosis, treatment, and/or medication information. It is not meant to be comprehensive and should be used as a tool to help the user understand and/or assess potential diagnostic and treatment options. It does NOT include all information about conditions, treatments, medications, side effects, or risks that may apply to a specific patient. It is not intended to be medical advice or a substitute for the medical advice, diagnosis, or treatment of a health care provider based on the health care provider's examination and assessment of a patient's specific and unique circumstances. Patients must speak with a health care provider for complete information about their health, medical questions, and treatment options, including any risks or benefits regarding use of medications. This information does not endorse any treatments or medications as safe, effective, or approved for treating a specific patient. UpToDate, Inc. and its affiliates disclaim any warranty or liability relating to this information or the use thereof.The use of this information is governed by the Terms of Use, available at https://www.woltersC-Vibesuwer.com/en/know/clinical-effectiveness-terms. 2024© UpToDate, Inc. and its affiliates and/or licensors. All rights reserved.  Copyright   © 2024 UpToDate, Inc. and/or its affiliates. All rights reserved.

## 2025-05-23 ENCOUNTER — TELEPHONE (OUTPATIENT)
Age: 20
End: 2025-05-23

## 2025-05-23 DIAGNOSIS — E78.01 FAMILIAL HYPERCHOLESTEREMIA: Primary | ICD-10-CM

## 2025-05-23 NOTE — TELEPHONE ENCOUNTER
Patient called back, read message. Patient is asking to speak to the provider due to being anxious about being referred to a cardiologist. She states that she would like to know if its something serious or is it just a check up

## 2025-05-23 NOTE — TELEPHONE ENCOUNTER
Patient called    Requesting lab results.  Patient had it done at [facility]   Results have been updated and ready for providers review.    Please review lab work and call patient with results    Please advise, and notify patient, Thank you     Meche - [phone]

## 2025-05-27 DIAGNOSIS — Z00.6 ENCOUNTER FOR EXAMINATION FOR NORMAL COMPARISON OR CONTROL IN CLINICAL RESEARCH PROGRAM: ICD-10-CM

## 2025-06-09 DIAGNOSIS — F41.9 ANXIETY: ICD-10-CM

## 2025-06-09 RX ORDER — ESCITALOPRAM OXALATE 10 MG/1
10 TABLET ORAL DAILY
Qty: 90 TABLET | Refills: 1 | Status: SHIPPED | OUTPATIENT
Start: 2025-06-09 | End: 2025-09-07

## 2025-06-17 ENCOUNTER — EVALUATION (OUTPATIENT)
Dept: OCCUPATIONAL THERAPY | Facility: CLINIC | Age: 20
End: 2025-06-17
Payer: COMMERCIAL

## 2025-06-17 DIAGNOSIS — M25.531 RIGHT WRIST PAIN: ICD-10-CM

## 2025-06-17 DIAGNOSIS — M67.431 GANGLION CYST OF DORSUM OF RIGHT WRIST: Primary | ICD-10-CM

## 2025-06-17 PROCEDURE — 97110 THERAPEUTIC EXERCISES: CPT | Performed by: OCCUPATIONAL THERAPIST

## 2025-06-17 PROCEDURE — 97165 OT EVAL LOW COMPLEX 30 MIN: CPT | Performed by: OCCUPATIONAL THERAPIST

## 2025-06-17 NOTE — PROGRESS NOTES
OT Evaluation     Today's date: 2025  Patient name: Meche Contreras  : 2005  MRN: [id number]  Referring provider: Abbe Elliott,*  Dx:   Encounter Diagnosis     ICD-10-CM    1. Ganglion cyst of dorsum of right wrist  M67.431       2. Right wrist pain  M25.531                      Assessment  Impairments: abnormal or restricted ROM, activity intolerance, impaired physical strength, lacks appropriate home exercise program, pain with function, weight-bearing intolerance and activity limitations  Symptom irritability: low    Assessment details: Meche is a 18 y/o RHD female presenting s/p right volar radial wrist ganglion cyst excision.  She reports a 5 year history of recurring pain and cyst swelling of the right wrist.  Due to complexity of the cyst, surgery was recommended.  DOS on 25.  She is off work due to injury but will be returning next week in hospital ER.  She will also be attending an Xray program in the fall.      Examination reveals decreased motion of the wrist in all planes.  Strength not assessed due to early healing stage.  Edema is minimal.  Surgical site remains with light scabbing and skin glue.  Pain at rest is minimal today.  No sensory complaints.   Evaluation is of low complexity, due to minimal comorbidities and stable clinical presentation.      Pt demonstrates good tolerance of therapy today and was provided with a written HEP focusing on AAROM of the wrist in all planes.  Gentle use encouraged with splint wear prn for heavier tasks.  She was instructed to perform exercises several times daily. The patient demonstrates HEP instructions appropriately, verbalizes understanding, and is in agreement with the written HEP.    Pt will benefit from skilled OT intervention to progress motion and strength, allowing  return to PLOF.         Understanding of Dx/Px/POC: excellent     Prognosis: excellent    Goals  STG 2 weeks   Increase wrist arc by at least 10 degrees in extension  and flexion   Increase forearm rotation by at least 10 degrees in supination and pronation.    Reduce edema to a minimal level   Reduce pain at rest to less than 2/10  LTG  By discharge   Achieve functional wrist arc, to greater than 80 degrees for all self care   Achieve functional FA arc to greater than 160 to allow carrying items in full rotation.     Achieve composite flexion of all digits to hold small items.     Achieve  strength to at least 50% of the uninvolved for independence in all self care.    Achieve FOTO goals established at .       Plan  Patient would benefit from: skilled occupational therapy  Planned modality interventions: thermotherapy: hydrocollator packs    Planned therapy interventions: IASTM, joint mobilization, kinesiology taping, manual therapy, massage, activity modification, nerve gliding, neuromuscular re-education, orthotic fitting/training, orthotic management and training, patient/caregiver education, strengthening, stretching, therapeutic activities, therapeutic exercise, home exercise program, graded exercise, graded activity, functional ROM exercises and fine motor coordination training    Duration in weeks: 6  Plan of Care beginning date: 2025  Plan of Care expiration date: 2025  Treatment plan discussed with: patient and family    Subjective Evaluation    History of Present Illness  Date of surgery: 2025  Mechanism of injury: surgery          Recurrent probem    Quality of life: good    Patient Goals  Patient goals for therapy: increased motion, decreased edema, decreased pain, increased strength and independence with ADLs/IADLs    Pain  Current pain ratin  Relieving factors: rest  Aggravating factors: lifting (pushing off)  Progression: improved    Social Support  Lives with: adult children    Employment status: not working  Hand dominance: right    Treatments  No previous or current treatments      Objective     Observations     Right Wrist/Hand    Positive for adhesive scar and edema.     Tenderness     Additional Tenderness Details  Volar radial wrist, right.    Neurological Testing     Sensation     Wrist/Hand     Right   Intact: light touch    Active Range of Motion     Left Wrist   Wrist flexion: 65 degrees   Wrist extension: 75 degrees   Radial deviation: 22 degrees   Ulnar deviation: 35 degrees     Right Wrist   Wrist flexion: 52 degrees   Wrist extension: 32 degrees   Radial deviation: 10 degrees   Ulnar deviation: 15 degrees     Right Thumb   Opposition: WNL    Additional Active Range of Motion Details  Right digital AROM WNL    Strength/Myotome Testing     Additional Strength Details  NT NA    Swelling     Left Wrist/Hand   Circumference wrist: 15 cm    Right Wrist/Hand   Circumference wrist: 15.2 cm           Precautions: DOS 6/2/25-  right volar wrist ganglion cyst excision    POC expires Unit limit Auth Expiration date PT/OT/ST + Visit Limit?   7/29 3 12/31 52         Dx ROSY CARPENTER @      CP0    Date 6/17            Visit 1            Manuals                                                                 Neuro Re-Ed                                                                                                        Ther Ex             HEP AAROM wrist E, Wrist F, RD, UD                                                                                                       Ther Activity                                       Gait Training                                       Modalities

## 2025-06-17 NOTE — LETTER
2025    Abbe Elliott MD  [address] [place] [id number]    Patient: Meche Contreras   YOB: 2005   Date of Visit: 2025     Encounter Diagnosis     ICD-10-CM    1. Ganglion cyst of dorsum of right wrist  M67.431       2. Right wrist pain  M25.531           Dear Dr. Abbe Elliott MD:    Thank you for your recent referral of Meche Contreras. Please review the attached evaluation summary from Meche's recent visit.     Please verify that you agree with the plan of care by signing the attached order.     If you have any questions or concerns, please do not hesitate to call.     I sincerely appreciate the opportunity to share in the care of one of your patients and hope to have another opportunity to work with you in the near future.     Sincerely,    Oly Norton, OT      Referring Provider:     I certify that I have read the below Plan of Care and certify the need for these services furnished under this plan of treatment while under my care.                    Abbe Elliott MD  [address] [place] [id number]  Via In Basket        OT Evaluation     Today's date: 2025  Patient name: Meche Contreras  : 2005  MRN: [id number]  Referring provider: Abbe Elliott,*  Dx:   Encounter Diagnosis     ICD-10-CM    1. Ganglion cyst of dorsum of right wrist  M67.431       2. Right wrist pain  M25.531                      Assessment  Impairments: abnormal or restricted ROM, activity intolerance, impaired physical strength, lacks appropriate home exercise program, pain with function, weight-bearing intolerance and activity limitations  Symptom irritability: low    Assessment details: Meche is a 20 y/o RHD female presenting s/p right volar radial wrist ganglion cyst excision.  She reports a 5 year history of recurring pain and cyst swelling of the right wrist.  Due to complexity of the cyst, surgery was recommended.  DOS on  6/2/25.  She is off work due to injury but will be returning next week in hospital ER.  She will also be attending an Xray program in the fall.      Examination reveals decreased motion of the wrist in all planes.  Strength not assessed due to early healing stage.  Edema is minimal.  Surgical site remains with light scabbing and skin glue.  Pain at rest is minimal today.  No sensory complaints.   Evaluation is of low complexity, due to minimal comorbidities and stable clinical presentation.      Pt demonstrates good tolerance of therapy today and was provided with a written HEP focusing on AAROM of the wrist in all planes.  Gentle use encouraged with splint wear prn for heavier tasks.  She was instructed to perform exercises several times daily. The patient demonstrates HEP instructions appropriately, verbalizes understanding, and is in agreement with the written HEP.    Pt will benefit from skilled OT intervention to progress motion and strength, allowing  return to PLOF.         Understanding of Dx/Px/POC: excellent     Prognosis: excellent    Goals  STG 2 weeks   Increase wrist arc by at least 10 degrees in extension and flexion   Increase forearm rotation by at least 10 degrees in supination and pronation.    Reduce edema to a minimal level   Reduce pain at rest to less than 2/10  LTG  By discharge   Achieve functional wrist arc, to greater than 80 degrees for all self care   Achieve functional FA arc to greater than 160 to allow carrying items in full rotation.     Achieve composite flexion of all digits to hold small items.     Achieve  strength to at least 50% of the uninvolved for independence in all self care.    Achieve FOTO goals established at IE.       Plan  Patient would benefit from: skilled occupational therapy  Planned modality interventions: thermotherapy: hydrocollator packs    Planned therapy interventions: IASTM, joint mobilization, kinesiology taping, manual therapy, massage, activity  modification, nerve gliding, neuromuscular re-education, orthotic fitting/training, orthotic management and training, patient/caregiver education, strengthening, stretching, therapeutic activities, therapeutic exercise, home exercise program, graded exercise, graded activity, functional ROM exercises and fine motor coordination training    Duration in weeks: 6  Plan of Care beginning date: 2025  Plan of Care expiration date: 2025  Treatment plan discussed with: patient and family    Subjective Evaluation    History of Present Illness  Date of surgery: 2025  Mechanism of injury: surgery          Recurrent probem    Quality of life: good    Patient Goals  Patient goals for therapy: increased motion, decreased edema, decreased pain, increased strength and independence with ADLs/IADLs    Pain  Current pain ratin  Relieving factors: rest  Aggravating factors: lifting (pushing off)  Progression: improved    Social Support  Lives with: adult children    Employment status: not working  Hand dominance: right    Treatments  No previous or current treatments      Objective     Observations     Right Wrist/Hand   Positive for adhesive scar and edema.     Tenderness     Additional Tenderness Details  Volar radial wrist, right.    Neurological Testing     Sensation     Wrist/Hand     Right   Intact: light touch    Active Range of Motion     Left Wrist   Wrist flexion: 65 degrees   Wrist extension: 75 degrees   Radial deviation: 22 degrees   Ulnar deviation: 35 degrees     Right Wrist   Wrist flexion: 52 degrees   Wrist extension: 32 degrees   Radial deviation: 10 degrees   Ulnar deviation: 15 degrees     Right Thumb   Opposition: WNL    Additional Active Range of Motion Details  Right digital AROM WNL    Strength/Myotome Testing     Additional Strength Details  NT NA    Swelling     Left Wrist/Hand   Circumference wrist: 15 cm    Right Wrist/Hand   Circumference wrist: 15.2 cm           Precautions: DOS 25-   right volar wrist ganglion cyst excision    POC expires Unit limit Auth Expiration date PT/OT/ST + Visit Limit?   7/29 3 12/31 52         Dx ROSY CARPENTER @      CP0    Date 6/17            Visit 1            Manuals                                                                 Neuro Re-Ed                                                                                                        Ther Ex             HEP AAROM wrist E, Wrist F, RD, UD                                                                                                       Ther Activity                                       Gait Training                                       Modalities

## 2025-06-17 NOTE — LETTER
2025    Abbe Elliott MD  [address] [place] [id number]    Patient: Meche Contreras   YOB: 2005   Date of Visit: 2025     Encounter Diagnosis     ICD-10-CM    1. Ganglion cyst of dorsum of right wrist  M67.431       2. Right wrist pain  M25.531           Dear Dr. Abbe Elliott MD:    Thank you for your recent referral of Meche Contreras. Please review the attached evaluation summary from Meche's recent visit.     Please verify that you agree with the plan of care by signing the attached order.     If you have any questions or concerns, please do not hesitate to call.     I sincerely appreciate the opportunity to share in the care of one of your patients and hope to have another opportunity to work with you in the near future.     Sincerely,    Oly Norton, OT      Referring Provider:     I certify that I have read the below Plan of Care and certify the need for these services furnished under this plan of treatment while under my care.                    Abbe Elliott MD  [address] [place] [id number]  Via Fax: [phone]        OT Evaluation     Today's date: 2025  Patient name: Meche Contreras  : 2005  MRN: [id number]  Referring provider: Abbe Elliott,*  Dx:   Encounter Diagnosis     ICD-10-CM    1. Ganglion cyst of dorsum of right wrist  M67.431       2. Right wrist pain  M25.531                      Assessment  Impairments: abnormal or restricted ROM, activity intolerance, impaired physical strength, lacks appropriate home exercise program, pain with function, weight-bearing intolerance and activity limitations  Symptom irritability: low    Assessment details: Meche is a 18 y/o RHD female presenting s/p right volar radial wrist ganglion cyst excision.  She reports a 5 year history of recurring pain and cyst swelling of the right wrist.  Due to complexity of the cyst, surgery was recommended.  DOS  on 6/2/25.  She is off work due to injury but will be returning next week in hospital ER.  She will also be attending an Xray program in the fall.      Examination reveals decreased motion of the wrist in all planes.  Strength not assessed due to early healing stage.  Edema is minimal.  Surgical site remains with light scabbing and skin glue.  Pain at rest is minimal today.  No sensory complaints.   Evaluation is of low complexity, due to minimal comorbidities and stable clinical presentation.      Pt demonstrates good tolerance of therapy today and was provided with a written HEP focusing on AAROM of the wrist in all planes.  Gentle use encouraged with splint wear prn for heavier tasks.  She was instructed to perform exercises several times daily. The patient demonstrates HEP instructions appropriately, verbalizes understanding, and is in agreement with the written HEP.    Pt will benefit from skilled OT intervention to progress motion and strength, allowing  return to PLOF.         Understanding of Dx/Px/POC: excellent     Prognosis: excellent    Goals  STG 2 weeks   Increase wrist arc by at least 10 degrees in extension and flexion   Increase forearm rotation by at least 10 degrees in supination and pronation.    Reduce edema to a minimal level   Reduce pain at rest to less than 2/10  LTG  By discharge   Achieve functional wrist arc, to greater than 80 degrees for all self care   Achieve functional FA arc to greater than 160 to allow carrying items in full rotation.     Achieve composite flexion of all digits to hold small items.     Achieve  strength to at least 50% of the uninvolved for independence in all self care.    Achieve FOTO goals established at IE.       Plan  Patient would benefit from: skilled occupational therapy  Planned modality interventions: thermotherapy: hydrocollator packs    Planned therapy interventions: IASTM, joint mobilization, kinesiology taping, manual therapy, massage, activity  modification, nerve gliding, neuromuscular re-education, orthotic fitting/training, orthotic management and training, patient/caregiver education, strengthening, stretching, therapeutic activities, therapeutic exercise, home exercise program, graded exercise, graded activity, functional ROM exercises and fine motor coordination training    Duration in weeks: 6  Plan of Care beginning date: 2025  Plan of Care expiration date: 2025  Treatment plan discussed with: patient and family    Subjective Evaluation    History of Present Illness  Date of surgery: 2025  Mechanism of injury: surgery          Recurrent probem    Quality of life: good    Patient Goals  Patient goals for therapy: increased motion, decreased edema, decreased pain, increased strength and independence with ADLs/IADLs    Pain  Current pain ratin  Relieving factors: rest  Aggravating factors: lifting (pushing off)  Progression: improved    Social Support  Lives with: adult children    Employment status: not working  Hand dominance: right    Treatments  No previous or current treatments      Objective     Observations     Right Wrist/Hand   Positive for adhesive scar and edema.     Tenderness     Additional Tenderness Details  Volar radial wrist, right.    Neurological Testing     Sensation     Wrist/Hand     Right   Intact: light touch    Active Range of Motion     Left Wrist   Wrist flexion: 65 degrees   Wrist extension: 75 degrees   Radial deviation: 22 degrees   Ulnar deviation: 35 degrees     Right Wrist   Wrist flexion: 52 degrees   Wrist extension: 32 degrees   Radial deviation: 10 degrees   Ulnar deviation: 15 degrees     Right Thumb   Opposition: WNL    Additional Active Range of Motion Details  Right digital AROM WNL    Strength/Myotome Testing     Additional Strength Details  NT NA    Swelling     Left Wrist/Hand   Circumference wrist: 15 cm    Right Wrist/Hand   Circumference wrist: 15.2 cm           Precautions: DOS 25-   right volar wrist ganglion cyst excision    POC expires Unit limit Auth Expiration date PT/OT/ST + Visit Limit?   7/29 3 12/31 52         Dx ROSY CARPENTER @      CP0    Date 6/17            Visit 1            Manuals                                                                 Neuro Re-Ed                                                                                                        Ther Ex             HEP AAROM wrist E, Wrist F, RD, UD                                                                                                       Ther Activity                                       Gait Training                                       Modalities

## 2025-06-23 ENCOUNTER — OFFICE VISIT (OUTPATIENT)
Dept: OCCUPATIONAL THERAPY | Facility: CLINIC | Age: 20
End: 2025-06-23
Attending: ORTHOPAEDIC SURGERY
Payer: COMMERCIAL

## 2025-06-23 DIAGNOSIS — M25.531 RIGHT WRIST PAIN: ICD-10-CM

## 2025-06-23 DIAGNOSIS — M67.431 GANGLION CYST OF DORSUM OF RIGHT WRIST: Primary | ICD-10-CM

## 2025-06-23 PROCEDURE — 97140 MANUAL THERAPY 1/> REGIONS: CPT | Performed by: OCCUPATIONAL THERAPIST

## 2025-06-23 PROCEDURE — 97110 THERAPEUTIC EXERCISES: CPT | Performed by: OCCUPATIONAL THERAPIST

## 2025-06-23 NOTE — PROGRESS NOTES
"Daily Note     Today's date: 2025  Patient name: Meche Contreras  : 2005  MRN: [id number]  Referring provider: Abbe Elliott,*  Dx:   Encounter Diagnosis     ICD-10-CM    1. Ganglion cyst of dorsum of right wrist  M67.431       2. Right wrist pain  M25.531                      Subjective:  I haven't been doing much with my wrist.  I don't like to touch it.       Objective: See treatment diary below    Active Range of Motion     Left Wrist   Wrist flexion: 65 degrees   Wrist extension: 75 degrees   Radial deviation: 22 degrees   Ulnar deviation: 35 degrees     Right Wrist   Wrist flexion: 52 degrees   Wrist extension: 52 degrees   Radial deviation: 10 degrees   Ulnar deviation: 15 degrees     Edwin #2  L  78 lbs,  R  67.6 lb.     Assessment: Tolerated treatment well. Patient would benefit from continued OT; Improved motion in wrist E/F;  Hypersensitivity of the volar wrist scar is moderate.        Plan: Continue per plan of care.  Progress treatment as tolerated.    Assess tissue scabbing, hypersensitivity of the scar.  Hep includes weight wearing into palm and desensitization HEP.     Precautions: DOS 25-  right volar wrist ganglion cyst excision    POC expires Unit limit Auth Expiration date PT/OT/ST + Visit Limit?    3  52         Dx R SUBHASH Elliott     FOTO @      CP0    Date       Visit 1 2      Manuals  15      Desensitize  Vibration mass      Tissue care  Scar mass                      Neuro Re-Ed                                                                Ther Ex    '      HEP AAROM wrist E, Wrist F, RD, UD Review HEP for continue care.    Desens HEP      PROM 1:1  Wrist all planes      AROM on table  FA wrist digits- all planes.       Wrist E PROM  Prayer    10x10\"        Stand/Sit press palm 10x10\"                                      Ther Activity                                        Modalities        MHP  Pre tx                   "

## 2025-06-26 ENCOUNTER — NURSE TRIAGE (OUTPATIENT)
Age: 20
End: 2025-06-26

## 2025-06-26 ENCOUNTER — OFFICE VISIT (OUTPATIENT)
Dept: INTERNAL MEDICINE CLINIC | Facility: CLINIC | Age: 20
End: 2025-06-26
Payer: COMMERCIAL

## 2025-06-26 VITALS
WEIGHT: 144 LBS | DIASTOLIC BLOOD PRESSURE: 68 MMHG | HEIGHT: 63 IN | RESPIRATION RATE: 17 BRPM | OXYGEN SATURATION: 98 % | SYSTOLIC BLOOD PRESSURE: 116 MMHG | HEART RATE: 69 BPM | BODY MASS INDEX: 25.52 KG/M2

## 2025-06-26 DIAGNOSIS — F41.9 ANXIETY: ICD-10-CM

## 2025-06-26 PROCEDURE — 99213 OFFICE O/P EST LOW 20 MIN: CPT

## 2025-06-26 RX ORDER — HYDROXYZINE HYDROCHLORIDE 25 MG/1
25 TABLET, FILM COATED ORAL EVERY 6 HOURS PRN
Qty: 30 TABLET | Refills: 0 | Status: SHIPPED | OUTPATIENT
Start: 2025-06-26

## 2025-06-26 RX ORDER — ESCITALOPRAM OXALATE 10 MG/1
10 TABLET ORAL DAILY
Qty: 90 TABLET | Refills: 1 | Status: SHIPPED | OUTPATIENT
Start: 2025-06-26 | End: 2025-06-26 | Stop reason: SDUPTHER

## 2025-06-26 RX ORDER — ESCITALOPRAM OXALATE 20 MG/1
20 TABLET ORAL DAILY
Qty: 90 TABLET | Refills: 1 | Status: SHIPPED | OUTPATIENT
Start: 2025-06-26 | End: 2025-12-23

## 2025-06-26 NOTE — PROGRESS NOTES
Name: Meche Contreras      : 2005      MRN: [id number]  Encounter Provider: LINO Benavides  Encounter Date: 2025   Encounter department: [facility] INTERNAL MEDICINE [place]  :  Assessment & Plan  Anxiety  Patient is acutely experiencing increased anxiety. She did feel like there was some room for improvement with her medication prior to this event. Will increase her dosing to 20mg and provided patient with hydroxyzine. Will follow up in 1 month with PCP.  Orders:  •  escitalopram (LEXAPRO) 20 mg tablet; Take 1 tablet (20 mg total) by mouth daily  •  hydrOXYzine HCL (ATARAX) 25 mg tablet; Take 1 tablet (25 mg total) by mouth every 6 (six) hours as needed for anxiety           History of Present Illness {?Quick Links Encounters * My Last Note * Last Note in Specialty * Snapshot * Since Last Visit * History :32754}  Meche is here today with increased anxiety concerns. She recently found out that her boyfriend cheated on her and she's having a difficult time adjusting to this news. She has no SI. She will be seeing her therapist today as well.    Anxiety  Symptoms include nausea and nervous/anxious behavior. Patient reports no chest pain, dizziness, palpitations, shortness of breath or suicidal ideas.         Review of Systems   Constitutional:  Positive for fatigue. Negative for chills and fever.   Respiratory:  Negative for cough, chest tightness and shortness of breath.    Cardiovascular:  Negative for chest pain, palpitations and leg swelling.   Gastrointestinal:  Positive for nausea. Negative for abdominal pain, constipation, diarrhea and vomiting.   Skin:  Negative for color change and rash.   Neurological:  Negative for dizziness, seizures, syncope and headaches.   Psychiatric/Behavioral:  Negative for suicidal ideas. The patient is nervous/anxious.    All other systems reviewed and are negative.      Objective {?Quick Links Trend Vitals * Enter New Vitals * Results Review * Timeline  "(Adult) * Labs * Imaging * Cardiology * Procedures * Lung Cancer Screening * Surgical eConsent :62382}  /68 (BP Location: Left arm, Patient Position: Sitting, Cuff Size: Adult)   Pulse 69   Resp 17   Ht 5' 3\" (1.6 m)   Wt 65.3 kg (144 lb)   SpO2 98%   BMI 25.51 kg/m²      Physical Exam  Vitals and nursing note reviewed.   Constitutional:       General: She is not in acute distress.     Appearance: She is well-developed.     Cardiovascular:      Rate and Rhythm: Normal rate and regular rhythm.      Pulses: Normal pulses.      Heart sounds: Normal heart sounds. No murmur heard.  Pulmonary:      Effort: Pulmonary effort is normal. No respiratory distress.      Breath sounds: Normal breath sounds.   Abdominal:      General: Bowel sounds are normal.     Neurological:      Mental Status: She is alert.     Psychiatric:         Mood and Affect: Mood is anxious. Affect is tearful.         "

## 2025-06-26 NOTE — TELEPHONE ENCOUNTER
"REASON FOR CONVERSATION: Anxiety    SYMPTOMS: Has decrease in appetite and nausea since Monday. Found out boyfriend cheated on her, and she is upset. Has no SI or HI, does feel overwhelmed and is crying on phone. No heart palpitations or shortness of breath.     OTHER HEALTH INFORMATION: Has appointment with therapist at 3 pm today, is on Lexapro 10 mg was calling to increase dose.     PROTOCOL DISPOSITION: See Today in Office    CARE ADVICE PROVIDED: Patient wanted to see Dr Arguelles, however no availability till Monday. Did offer appointment with Isabela as patient wanted to see a female provider.   Scheduled for 12 in office with Isabela.    PRACTICE FOLLOW-UP: none at this time patient is coming to be seen.          Reason for Disposition   Patient sounds very upset or troubled to the triager    Answer Assessment - Initial Assessment Questions  1. CONCERN: \"Did anything happen that prompted you to call today?\"       Boyfriend cheated on her and she is overcome with anxiety     2. ANXIETY SYMPTOMS: \"Can you describe how you (your loved one; patient) have been feeling?\" (e.g., tense, restless, panicky, anxious, keyed up, overwhelmed, sense of impending doom).       Tense, anxious and overwhelmed    3. ONSET: \"How long have you been feeling this way?\" (e.g., hours, days, weeks)      Started yesterday  4. SEVERITY: \"How would you rate the level of anxiety?\" (e.g., 0 - 10; or mild, moderate, severe).      8/10  5. FUNCTIONAL IMPAIRMENT: \"How have these feelings affected your ability to do daily activities?\" \"Have you had more difficulty than usual doing your normal daily activities?\" (e.g., getting better, same, worse; self-care, school, work, interactions)      Interfering with work and everyday life very tearful on phone    6. HISTORY: \"Have you felt this way before?\" \"Have you ever been diagnosed with an anxiety problem in the past?\" (e.g., generalized anxiety disorder, panic attacks, PTSD). If Yes, ask: \"How " "was this problem treated?\" (e.g., medicines, counseling, etc.)      Yes has history of anxiety is on Lexapro, does see a counselor    7. RISK OF HARM - SUICIDAL IDEATION: \"Do you ever have thoughts of hurting or killing yourself?\" If Yes, ask:  \"Do you have these feelings now?\" \"Do you have a plan on how you would do this?\"      June any HI or SI     8. TREATMENT:  \"What has been done so far to treat this anxiety?\" (e.g., medicines, relaxation strategies). \"What has helped?\"      Lexapro and has been trying to do relaxation strategies     9. THERAPIST: \"Do you have a counselor or therapist?\" If Yes, ask: \"What is their name?\"      Yes is seeing a therapist this afternoon at 3 pm            11. PATIENT SUPPORT: \"Who is with you now?\" \"Who do you live with?\" \"Do you have family or friends who you can talk to?\"         At work currently, has been taking to friend  12. OTHER SYMPTOMS: \"Do you have any other symptoms?\" (e.g., feeling depressed, trouble concentrating, trouble sleeping, trouble breathing, palpitations or fast heartbeat, chest pain, sweating, nausea, or diarrhea)        No breathing problems or chest pain. Has feeling of depression because of what is happening. Trouble concentrating     13. PREGNANCY: \"Is there any chance you are pregnant?\" \"When was your last menstrual period?\"        No    Protocols used: Anxiety and Panic Attack-Adult-[place]    "

## 2025-06-26 NOTE — ASSESSMENT & PLAN NOTE
Patient is acutely experiencing increased anxiety. She did feel like there was some room for improvement with her medication prior to this event. Will increase her dosing to 20mg and provided patient with hydroxyzine. Will follow up in 1 month with PCP.  Orders:  •  escitalopram (LEXAPRO) 20 mg tablet; Take 1 tablet (20 mg total) by mouth daily  •  hydrOXYzine HCL (ATARAX) 25 mg tablet; Take 1 tablet (25 mg total) by mouth every 6 (six) hours as needed for anxiety

## 2025-06-30 ENCOUNTER — OFFICE VISIT (OUTPATIENT)
Dept: OCCUPATIONAL THERAPY | Facility: CLINIC | Age: 20
End: 2025-06-30
Attending: ORTHOPAEDIC SURGERY
Payer: COMMERCIAL

## 2025-06-30 DIAGNOSIS — M25.531 RIGHT WRIST PAIN: ICD-10-CM

## 2025-06-30 DIAGNOSIS — M67.431 GANGLION CYST OF DORSUM OF RIGHT WRIST: Primary | ICD-10-CM

## 2025-06-30 PROCEDURE — 97110 THERAPEUTIC EXERCISES: CPT | Performed by: OCCUPATIONAL THERAPIST

## 2025-06-30 PROCEDURE — 97140 MANUAL THERAPY 1/> REGIONS: CPT | Performed by: OCCUPATIONAL THERAPIST

## 2025-06-30 NOTE — PROGRESS NOTES
Daily Note     Today's date: 2025  Patient name: Meche Contreras  : 2005  MRN: [id number]  Referring provider: Abbe Elliott,*  Dx:   Encounter Diagnosis     ICD-10-CM    1. Ganglion cyst of dorsum of right wrist  M67.431       2. Right wrist pain  M25.531                      Subjective:   I don't like to touch it.  Still so sensitive      Objective: See treatment diary below    Active Range of Motion     Left Wrist   Wrist flexion: 65 degrees   Wrist extension: 75 degrees   Radial deviation: 22 degrees   Ulnar deviation: 35 degrees     Right Wrist   Wrist flexion: 65 degrees   Wrist extension: 52 degrees   Radial deviation: 20 degrees   Ulnar deviation: 25 degrees     Edwin #2  L  78 lbs,  R  67.6 lb.     Assessment: Tolerated treatment well. Patient would benefit from continued OT; Improved motion in wrist E/F;   Good tolerance to weight bearing onto palms.   Hypersensitivity of the volar wrist scar continues to be  moderate. Excellent increase of motion and strength.         Plan: Continue per plan of care.  Progress treatment as tolerated.    Assess  hypersensitivity of the scar.  HEP upgraded to  include table  weight bearing onto palms and progression of desensitization.     Precautions: DOS 25-  right volar wrist ganglion cyst excision    POC expires Unit limit Auth Expiration date PT/OT/ST + Visit Limit?    3  52         Dx R W Carmen Elliott     FOTO @      CP0    Date  F NV    Visit 1 2 3     Manuals  15 15     Desensitize  Vibration mass Vibration soft textures     Tissue care  Scar mass Scar mass                     Neuro Re-Ed                                                                Ther Ex         HEP AAROM wrist E, Wrist F, RD, UD Review HEP for continue care.    Desens HEP Weight bear onto table, progress desens textures     PROM 1:1  Wrist all planes Wrist all planes     AROM on table  FA wrist digits- all planes.  FA wrist     "  Wrist E PROM  Prayer    10x10\" Prayer 10x10       Stand/Sit press palm 10x10\" Sit/stand press   10x10  On table bilateral  10x10                                     Ther Activity                                        Modalities        MHP  Pre tx Pre tx                  "

## 2025-07-07 ENCOUNTER — OFFICE VISIT (OUTPATIENT)
Dept: OCCUPATIONAL THERAPY | Facility: CLINIC | Age: 20
End: 2025-07-07
Attending: ORTHOPAEDIC SURGERY
Payer: COMMERCIAL

## 2025-07-07 DIAGNOSIS — M67.431 GANGLION CYST OF DORSUM OF RIGHT WRIST: Primary | ICD-10-CM

## 2025-07-07 DIAGNOSIS — M25.531 RIGHT WRIST PAIN: ICD-10-CM

## 2025-07-07 PROCEDURE — 97110 THERAPEUTIC EXERCISES: CPT | Performed by: OCCUPATIONAL THERAPIST

## 2025-07-07 PROCEDURE — 97140 MANUAL THERAPY 1/> REGIONS: CPT | Performed by: OCCUPATIONAL THERAPIST

## 2025-07-07 NOTE — PROGRESS NOTES
Daily Note     Today's date: 2025  Patient name: Meche Contreras  : 2005  MRN: [id number]  Referring provider: Abbe Elliott,*  Dx:   Encounter Diagnosis     ICD-10-CM    1. Ganglion cyst of dorsum of right wrist  M67.431       2. Right wrist pain  M25.531                      Subjective:   The scar looks funny.  Still really sensitive.      Objective: See treatment diary below    Active Range of Motion     Left Wrist   Wrist flexion: 65 degrees   Wrist extension: 75 degrees   Radial deviation: 22 degrees   Ulnar deviation: 35 degrees     Right Wrist   Wrist flexion: 65 degrees   Wrist extension: 52 degrees   Radial deviation: 20 degrees   Ulnar deviation: 25 degrees     Edwin #2  L  78 lbs,  R  67.6 lb.     Assessment: Tolerated treatment well. Patient would benefit from continued OT; Good tolerance to weight bearing onto palms.   Hypersensitivity of the volar wrist scar mild today.  Scar is mildly hypertrophic. Excellent increase of motion and strength.         Plan: Continue per plan of care.  Progress treatment as tolerated.    Continue desensitization for  hypersensitivity of the scar.   Added scar tissue care to HEP with taping and elastomer use.     Precautions: DOS 25-  right volar wrist ganglion cyst excision    POC expires Unit limit Auth Expiration date PT/OT/ST + Visit Limit?    3  52         Dx R W Gang     Dr Elliott       FOTO      CP0    Date     Visit 1 2 3 4    Manuals  15 15 25    Desensitize  Vibration mass Vibration soft textures Textures, vibration    Scar care  Scar mass Scar mass Paper tape day; elastomer HOS with tub or coban    STM    Scar mass            Neuro Re-Ed                                                                Ther Ex       15    HEP AAROM wrist E, Wrist F, RD, UD Review HEP for continue care.    Desens HEP Weight bear onto table, progress desens textures Scar program,   Continue   Desens,  Scar massage    PROM  "1:1  Wrist all planes Wrist all planes Wrist all planes    AROM on table  FA wrist digits- all planes.  FA wrist      Wrist E PROM  Prayer    10x10\" Prayer 10x10 Prayer 10x10      Stand/Sit press palm 10x10\" Sit/stand press   10x10  On table bilateral  10x10                                     Ther Activity                                        Modalities        MHP  Pre tx Pre tx Pre tx                 "

## 2025-07-14 ENCOUNTER — OFFICE VISIT (OUTPATIENT)
Dept: OCCUPATIONAL THERAPY | Facility: CLINIC | Age: 20
End: 2025-07-14
Attending: ORTHOPAEDIC SURGERY
Payer: COMMERCIAL

## 2025-07-14 DIAGNOSIS — M25.531 RIGHT WRIST PAIN: ICD-10-CM

## 2025-07-14 DIAGNOSIS — M67.431 GANGLION CYST OF DORSUM OF RIGHT WRIST: Primary | ICD-10-CM

## 2025-07-14 PROCEDURE — 97110 THERAPEUTIC EXERCISES: CPT | Performed by: OCCUPATIONAL THERAPIST

## 2025-07-14 PROCEDURE — 97140 MANUAL THERAPY 1/> REGIONS: CPT | Performed by: OCCUPATIONAL THERAPIST

## 2025-07-14 NOTE — LETTER
2025    Abbe Elliott MD  [address] [place] [id number]    Patient: Meche Contreras   YOB: 2005   Date of Visit: 2025     Encounter Diagnosis     ICD-10-CM    1. Ganglion cyst of dorsum of right wrist  M67.431       2. Right wrist pain  M25.531           Dear Dr. Abbe Elliott MD:    Thank you for your recent referral of Meche Contreras. Please review the attached evaluation summary from Meche's recent visit.     Please verify that you agree with the plan of care by signing the attached order.     If you have any questions or concerns, please do not hesitate to call.     I sincerely appreciate the opportunity to share in the care of one of your patients and hope to have another opportunity to work with you in the near future.     Sincerely,    Oly Norton, OT      Referring Provider:     I certify that I have read the below Plan of Care and certify the need for these services furnished under this plan of treatment while under my care.                    Abbe Elliott MD  [address] [place] [address]  Via Fax: [phone]        Daily Note/Discharge Summary     Today's date: 2025  Patient name: Meche Contreras  : 2005  MRN: [id number]  Referring provider: Abbe Elliott,*  Dx:   Encounter Diagnosis     ICD-10-CM    1. Ganglion cyst of dorsum of right wrist  M67.431       2. Right wrist pain  M25.531                      Subjective:   The scar looks better and I am back to work.       Objective: See treatment diary below    Active Range of Motion     Left Wrist   Wrist flexion: 65 degrees   Wrist extension: 75 degrees   Radial deviation: 22 degrees   Ulnar deviation: 35 degrees     Right Wrist   Wrist flexion: 65 degrees   Wrist extension: 52 degrees   Radial deviation: 20 degrees   Ulnar deviation: 25 degrees     Edwin #2  L  78 lbs,  R  67.6 lb.     Tissue  Scar sensitivity resolving.  Scar texture  being addressed with massage, taping, and elastomer during HOS.     Assessment: Tolerated treatment well. Strength and motion functional.  Good tolerance to weight bearing onto palms.   Hypersensitivity of the volar wrist scar resolving.  Therapy goals met.       Goals  STG 2 weeks   Increase wrist arc by at least 10 degrees in extension and flexion MET   Increase forearm rotation by at least 10 degrees in supination and pronation. MET   Reduce edema to a minimal level MET   Reduce pain at rest to less than 2/10MET  LTG  By discharge   Achieve functional wrist arc, to greater than 80 degrees for all self care MET   Achieve functional FA arc to greater than 160 to allow carrying items in full rotation.  MET   Achieve composite flexion of all digits to hold small items.  MET   Achieve  strength to at least 50% of the uninvolved for independence in all self care. MET       Plan: May discharge to [facility] with focus on continued care of scar.       Precautions: DOS 6/2/25-  right volar wrist ganglion cyst excision    POC expires Unit limit Auth Expiration date PT/OT/ST + Visit Limit?   7/29 3 12/31 52         Dx R W Gang     Dr Elliott  7/17     FOTO 1 pt. less     CP0    Date 6/17 6/23 6/30 7/7  F 7/14  DC   Visit 1 2 3 4 5   Manuals  15 15 25 13   Desensitize  Vibration mass Vibration soft textures Textures, vibration Cont, add deep mass   Scar care  Scar mass Scar mass Paper tape day; elastomer HOS with tub or coban Scar comformer wear;  tape with wrist in E   STM    Scar mass Scar mass           Neuro Re-Ed                                                                Ther Ex    23'   23   15     10'   HEP AAROM wrist E, Wrist F, RD, UD Review HEP for continue care.    Desens HEP Weight bear onto table, progress desens textures Scar program,   Continue   Desens,  Scar massage Review all HEP for DC   PROM 1:1  Wrist all planes Wrist all planes Wrist all planes Wrist all planes   AROM on table  FA wrist digits- all  "planes.  FA wrist      Wrist E PROM  Prayer    10x10\" Prayer 10x10 Prayer 10x10 Stand loading palm      Stand/Sit press palm 10x10\" Sit/stand press   10x10  On table bilateral  10x10                                     Ther Activity                                        Modalities        MHP  Pre tx Pre tx Pre tx                 "

## 2025-07-14 NOTE — PROGRESS NOTES
Daily Note/Discharge Summary     Today's date: 2025  Patient name: Meche Contreras  : 2005  MRN: [id number]  Referring provider: Abbe Elliott,*  Dx:   Encounter Diagnosis     ICD-10-CM    1. Ganglion cyst of dorsum of right wrist  M67.431       2. Right wrist pain  M25.531                      Subjective:   The scar looks better and I am back to work.       Objective: See treatment diary below    Active Range of Motion     Left Wrist   Wrist flexion: 65 degrees   Wrist extension: 75 degrees   Radial deviation: 22 degrees   Ulnar deviation: 35 degrees     Right Wrist   Wrist flexion: 65 degrees   Wrist extension: 52 degrees   Radial deviation: 20 degrees   Ulnar deviation: 25 degrees     Edwin #2  L  78 lbs,  R  67.6 lb.     Tissue  Scar sensitivity resolving.  Scar texture being addressed with massage, taping, and elastomer during HOS.     Assessment: Tolerated treatment well. Strength and motion functional.  Good tolerance to weight bearing onto palms.   Hypersensitivity of the volar wrist scar resolving.  Therapy goals met.       Goals  STG 2 weeks   Increase wrist arc by at least 10 degrees in extension and flexion MET   Increase forearm rotation by at least 10 degrees in supination and pronation. MET   Reduce edema to a minimal level MET   Reduce pain at rest to less than 2/10MET  LTG  By discharge   Achieve functional wrist arc, to greater than 80 degrees for all self care MET   Achieve functional FA arc to greater than 160 to allow carrying items in full rotation.  MET   Achieve composite flexion of all digits to hold small items.  MET   Achieve  strength to at least 50% of the uninvolved for independence in all self care. MET       Plan: May discharge to [facility] with focus on continued care of scar.       Precautions: DOS 25-  right volar wrist ganglion cyst excision    POC expires Unit limit Auth Expiration date PT/OT/ST + Visit Limit?    3  52         Dx ROSY Morales Dr  "Elliott  7/17     FOTO 1 pt. less     CP0    Date 6/17 6/23 6/30 7/7  F 7/14  DC   Visit 1 2 3 4 5   Manuals  15 15 25 13   Desensitize  Vibration mass Vibration soft textures Textures, vibration Cont, add deep mass   Scar care  Scar mass Scar mass Paper tape day; elastomer HOS with tub or coban Scar comformer wear;  tape with wrist in E   STM    Scar mass Scar mass           Neuro Re-Ed                                                                Ther Ex    23'   23   15     10'   HEP AAROM wrist E, Wrist F, RD, UD Review HEP for continue care.    Desens HEP Weight bear onto table, progress desens textures Scar program,   Continue   Desens,  Scar massage Review all HEP for DC   PROM 1:1  Wrist all planes Wrist all planes Wrist all planes Wrist all planes   AROM on table  FA wrist digits- all planes.  FA wrist      Wrist E PROM  Prayer    10x10\" Prayer 10x10 Prayer 10x10 Stand loading palm      Stand/Sit press palm 10x10\" Sit/stand press   10x10  On table bilateral  10x10                                     Ther Activity                                        Modalities        MHP  Pre tx Pre tx Pre tx                 "

## 2025-07-22 ENCOUNTER — RA CDI HCC (OUTPATIENT)
Dept: OTHER | Facility: HOSPITAL | Age: 20
End: 2025-07-22

## 2025-07-28 DIAGNOSIS — N92.6 IRREGULAR MENSES: ICD-10-CM

## 2025-07-28 RX ORDER — NORETHINDRONE ACETATE AND ETHINYL ESTRADIOL 1MG-20(21)
1 KIT ORAL DAILY
Qty: 84 TABLET | Refills: 0 | Status: SHIPPED | OUTPATIENT
Start: 2025-07-28

## 2025-08-07 DIAGNOSIS — Z11.1 ENCOUNTER FOR TB TINE TEST: Primary | ICD-10-CM

## 2025-08-15 LAB
GAMMA INTERFERON BACKGROUND BLD IA-ACNC: 0.03 IU/ML
M TB IFN-G BLD-IMP: NEGATIVE
M TB IFN-G CD4+ BCKGRND COR BLD-ACNC: 0 IU/ML
M TB IFN-G CD4+ BCKGRND COR BLD-ACNC: 0.01 IU/ML
MITOGEN IGNF BLD-ACNC: >10 IU/ML
QUANTIFERON INCUBATION COMMENT: NORMAL